# Patient Record
Sex: FEMALE | Race: WHITE
[De-identification: names, ages, dates, MRNs, and addresses within clinical notes are randomized per-mention and may not be internally consistent; named-entity substitution may affect disease eponyms.]

---

## 2017-01-25 ENCOUNTER — HOSPITAL ENCOUNTER (OUTPATIENT)
Dept: HOSPITAL 45 - C.ULTR | Age: 27
Discharge: HOME | End: 2017-01-25
Attending: PREVENTIVE MEDICINE
Payer: COMMERCIAL

## 2017-01-25 DIAGNOSIS — Z01.89: Primary | ICD-10-CM

## 2017-01-25 NOTE — DIAGNOSTIC IMAGING REPORT
RIGHT LOWER EXTREMITY VENOUS DOPPLER



CLINICAL HISTORY: Right leg pain.    



COMPARISON STUDY:  No previous studies for comparison. 



TECHNIQUE:  Sonography of the deep venous system of the right lower extremity

was performed. Compression and augmentation were evaluated.



FINDINGS:  The common femoral, superficial femoral and popliteal veins were

compressible. Augmentation was normal. Flow was shown within the deep calf

vessels.



IMPRESSION: No evidence of deep venous thrombus within the right lower

extremity.







Electronically signed by:  Fabian Solano M.D.

1/25/2017 1:04 PM



Dictated Date/Time:  1/25/2017 1:04 PM

## 2017-01-28 NOTE — CODING QUERY NO DIAGNOSIS
TREATMENT RENDERED WITHOUT A DIAGNOSIS                                                  



To promote full compliance with coding requirements relating to patient care, physician 
participation is requested in all cases of  uncertainty.  Please assist us with 
providing a diagnosis/symptom for the test(s) below:



A diagnosis/symptom was not documented on your Order.  A valid diagnosis/symptom is 
required to bill all insurances.



**Please remember that we are unable to code a diagnosis of rule out, probable, possible, 
questionable, or suspected.  



Tests that require a diagnosis:



DOS: 1/25/17

* RIGHT LEG DOPPLER US      DIAGNOSIS:





Provider Signature: _____________________________ Date: _________



Thank you  

[facility] Information Management

Phone:  [phone]

Fax:  [phone]



Once completed, please kindly fax back to [phone]



For questions please call [phone]

## 2017-05-08 ENCOUNTER — HOSPITAL ENCOUNTER (EMERGENCY)
Dept: HOSPITAL 45 - C.EDB | Age: 27
Discharge: HOME | End: 2017-05-08
Payer: COMMERCIAL

## 2017-05-08 VITALS
BODY MASS INDEX: 26.3 KG/M2 | BODY MASS INDEX: 26.3 KG/M2 | WEIGHT: 157.85 LBS | HEIGHT: 65 IN | WEIGHT: 157.85 LBS | HEIGHT: 65 IN

## 2017-05-08 VITALS — SYSTOLIC BLOOD PRESSURE: 131 MMHG | HEART RATE: 72 BPM | OXYGEN SATURATION: 98 % | DIASTOLIC BLOOD PRESSURE: 80 MMHG

## 2017-05-08 VITALS — TEMPERATURE: 98.24 F

## 2017-05-08 DIAGNOSIS — Z87.440: ICD-10-CM
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
## 2017-05-08 DIAGNOSIS — M54.5: Primary | ICD-10-CM

## 2017-05-08 DIAGNOSIS — Z87.19: ICD-10-CM

## 2017-05-08 DIAGNOSIS — Z79.899: ICD-10-CM

## 2017-05-08 NOTE — EMERGENCY ROOM VISIT NOTE
History


First contact with patient:  16:42


Chief Complaint:  BACK PAIN


Stated Complaint:  BULGING DISC,NUMBNESS RT LEG,RT HIP PAIN,WC





History of Present Illness


The patient is a 26 year old female who presents to the Emergency Room with 

complaints of persistent lower back pain radiating down the right leg to her 

foot.  The patient reports that she sustained a Worker's Compensation injury on 

1/24/17.  She has been under the management of Dr. Ibarra, [facility] and Dr. Shanks.  The patient reports that she had an appointment 

scheduled with the pain clinic on 5/4, but was rescheduled for 5/18.  She also 

had an appointment scheduled with Dr. Shanks 1/24.  The patient reports that 

she had an independent medical evaluation (MINDI) performed with Dr. Gupta as part 

of her Worker's Compensation evaluation.  The patient reports that her case was 

closed on 5/1/17, and she was directed back to work for full duty without any 

restrictions.  The patient reports progressively worsening symptoms at this 

point, and was told by her  to come to the emergency department.  The 

patient and MRI performed at the time of injury.  The patient denies any 

interval significant trauma to the back.  She currently denies any right lower 

extremity weakness, foot drop, saddle anesthesias or bladder/bowel difficulties/

incontinence.  She rates her discomfort a 6 out of 10.





Review of Systems


10 system review was performed and was negative except for pertinent positives 

and negatives as indicated in history of present illness





Past Medical/Surgical History


Medical Problems:


(1) Bleeding


(2) Bronchitis


(3) Headache in pregnancy


(4) History of urinary tract problem


(5) possible rupture of membranes


(6) spontaneous rupture of membranes at term


(7) Stomach problems


Surgical Problems:


(1) H/O colonoscopy


(2) History of wisdom tooth extraction








Family History





Cancer


Heart disease


Hypertension


Kidney disease


Kidney stones





Social History


Smoking Status:  Never Smoker


Alcohol Use:  none


Drug Use:  none


Marital Status:  in relationship


Occupation Status:  employed





Current/Historical Medications


Scheduled


Birth Control Pills (Birth Control Pills), 1 TAB PO DAILY


Diclofenac (Voltaren), 50 MG PO Q12


Methylprednisolone (Medrol Dosepak), 0 PO DAILY





Allergies


Coded Allergies:  


     No Known Allergies (Unverified , 10/23/16)





Physical Exam


Vital Signs











  Date Time  Temp Pulse Resp B/P Pulse Ox O2 Delivery O2 Flow Rate FiO2


 


5/8/17 17:22  72 18 131/80 98 Room Air  


 


5/8/17 16:14 36.8 82 18 143/98 98 Room Air  








Pain Rating (0-10):  6.0





Physical Exam


CONSTITUTIONAL:  Healthy and well nourished.  Alert and oriented X 3 with 

positive affect.  Patient does not appear in any acute distress on exam.


HEENT:  Normocephalic, atraumatic.  Pupils equal, round and reactive.  


NECK:  Full active range of motion without discomfort.


RESPIRATORY:  Clear to auscultation bilaterally with no wheezing, crackles, 

rhonchi or stridor.


CARDIOVASCULAR:  Regular rate and rhythm with no murmurs, rubs or gallops.


GASTROINTESTINAL:  Bowel sounds present in all quadrants.  Soft and nontender 

to palpation.


MUSCULOSKELETAL: Examination shows tenderness to palpation through the right 

lower lumbar region, SI joint and sciatic notch.  Negative logroll.  Positive 

straight leg raise.  Ankle plantar/dorsiflexion strength is 5 out of 5 and 

symmetric bilaterally.  Pedal pulses are intact.


INTEGUMENTARY:  No rash or other significant dermatologic conditions noted.


NEUROLOGIC:  No focal neurologic deficits noted.  Lower extremity deep tendon 

reflexes are 2+ and symmetric bilaterally.





Medical Decision & Procedures


ED Course


Patient history and physical exam were performed.  Nurse's notes were reviewed.

  I did review prior medical records, including the patient's office notes at 

the pain clinic on 4/11/17.  On that visit, it was suggested that the patient 

undergo an L5-S1 intralaminar epidural steroid injection.  As indicated in the 

history of present illness, she was scheduled this month for that injection.  

She also reports that Dr. Shanks was going to wait to see how she responded to 

the injection.  Also as indicated in history of present illness, the patient 

was sent back to work without restrictions as a result of her IMNDI on 4/24/17.  

After explaining her difficulties with her , she was sent to the 

emergency department.  The patient is requesting another note for light duty.  

The patient was advised that I do not have any power to override her Worker's 

Compensation decision.  I did try to call Dr. Ibarra, the patient's primary 

Worker's Compensation provider.  He was not in the office today, nor was his 

nurse practitioner, Lea Pino.  I did leave a message for her to call the 

patient regarding further details of her MINDI, and other possible options for 

her case.  I suspect the patient will have to work through her  to 

figure this out.  In the meantime, the patient was provided a prescription for 

a Medrol Dosepak.  The patient is currently taking Voltaren 50 mg twice a day, 

but reports that it gives her headaches.  She is able to tolerate ibuprofen, 

and was therefore encouraged to alternate ibuprofen and Tylenol for pain.  She 

refused any prescription analgesics.  The patient was advised that she may need 

to follow-up with her PCP, pain clinic and Dr. Shanks until her Worker's 

Compensation situation is figured out.  The patient voiced understanding of all 

discharge instructions, was happy with plan of care, and rated her discomfort a 

4 out of 10 at the time of discharge.





Impression





 Primary Impression:  


 Lumbar pain


 Additional Impression:  


 Work related injury





Departure Information


Dispostion


Home / Self-Care





Condition


GOOD





Prescriptions





Methylprednisolone (MEDROL DOSEPAK) 4 Mg Son


0 PO DAILY, #1 PKT


   Prov: Heath Jennings PA         5/8/17





Referrals


Ashutosh Ibarra M.D.





Forms


HOME CARE DOCUMENTATION FORM,                                                 

               IMPORTANT VISIT INFORMATION





Patient Instructions


My [facility]





Additional Instructions





Ibuprofen 800 mg and/or Tylenol 1000 mg every 8 hours.


You may also alternate these medications for more effective pain relief:


Ibuprofen --4 HRS--> Tylenol --4 HRS--> ibuprofen --4 HRS--> Tylenol ....


Stop the diclofenac for now.


Take Medrol Dosepak as prescribed.


Continue follow-up with your family doctor, pain clinic and Dr. Shanks until 

your  further evaluates your case.





Problem Qualifiers








 Primary Impression:  


 Lumbar pain


 Chronicity:  chronic  Back pain laterality:  right  Sciatica presence:  with 

sciatica  Sciatica laterality:  sciatica of right side  Qualified Codes:  

M54.41 - Lumbago with sciatica, right side; G89.29 - Other chronic pain

## 2017-10-29 ENCOUNTER — HOSPITAL ENCOUNTER (EMERGENCY)
Dept: HOSPITAL 45 - C.EDB | Age: 27
Discharge: HOME | End: 2017-10-29
Payer: COMMERCIAL

## 2017-10-29 VITALS — HEART RATE: 75 BPM | OXYGEN SATURATION: 98 % | DIASTOLIC BLOOD PRESSURE: 76 MMHG | SYSTOLIC BLOOD PRESSURE: 118 MMHG

## 2017-10-29 VITALS
WEIGHT: 151.46 LBS | BODY MASS INDEX: 25.23 KG/M2 | HEIGHT: 65 IN | HEIGHT: 65 IN | BODY MASS INDEX: 25.23 KG/M2 | WEIGHT: 151.46 LBS

## 2017-10-29 VITALS — TEMPERATURE: 98.42 F

## 2017-10-29 DIAGNOSIS — Z84.1: ICD-10-CM

## 2017-10-29 DIAGNOSIS — R16.1: ICD-10-CM

## 2017-10-29 DIAGNOSIS — R59.1: ICD-10-CM

## 2017-10-29 DIAGNOSIS — Z79.3: ICD-10-CM

## 2017-10-29 DIAGNOSIS — Z82.49: ICD-10-CM

## 2017-10-29 DIAGNOSIS — J02.9: Primary | ICD-10-CM

## 2017-10-29 DIAGNOSIS — Z80.9: ICD-10-CM

## 2017-10-29 LAB
ALBUMIN/GLOB SERPL: 0.9 {RATIO} (ref 0.9–2)
ALP SERPL-CCNC: 56 U/L (ref 45–117)
ALT SERPL-CCNC: 15 U/L (ref 12–78)
ANION GAP SERPL CALC-SCNC: 5 MMOL/L (ref 3–11)
APPEARANCE UR: (no result)
AST SERPL-CCNC: 10 U/L (ref 15–37)
BASOPHILS # BLD: 0.02 K/UL (ref 0–0.2)
BASOPHILS NFR BLD: 0.3 %
BILIRUB UR-MCNC: (no result) MG/DL
BUN SERPL-MCNC: 10 MG/DL (ref 7–18)
BUN/CREAT SERPL: 12.9 (ref 10–20)
CALCIUM SERPL-MCNC: 8.9 MG/DL (ref 8.5–10.1)
CHLORIDE SERPL-SCNC: 104 MMOL/L (ref 98–107)
CO2 SERPL-SCNC: 29 MMOL/L (ref 21–32)
COLOR UR: (no result)
COMPLETE: YES
CREAT CL PREDICTED SERPL C-G-VRATE: 107.4 ML/MIN
CREAT SERPL-MCNC: 0.78 MG/DL (ref 0.6–1.2)
EOSINOPHIL NFR BLD AUTO: 217 K/UL (ref 130–400)
GLOBULIN SER-MCNC: 3.7 GM/DL (ref 2.5–4)
GLUCOSE SERPL-MCNC: 77 MG/DL (ref 70–99)
HCT VFR BLD CALC: 38.5 % (ref 37–47)
IG%: 0.3 %
IMM GRANULOCYTES NFR BLD AUTO: 15.2 %
LYMPHOCYTES # BLD: 1.18 K/UL (ref 1.2–3.4)
MANUAL MICROSCOPIC REQUIRED?: NO
MCH RBC QN AUTO: 29.8 PG (ref 25–34)
MCHC RBC AUTO-ENTMCNC: 33.5 G/DL (ref 32–36)
MCV RBC AUTO: 88.9 FL (ref 80–100)
MONOCYTES NFR BLD: 16 %
NEUTROPHILS # BLD AUTO: 0 %
NEUTROPHILS NFR BLD AUTO: 68.2 %
NITRITE UR QL STRIP: (no result)
PH UR STRIP: 5.5 [PH] (ref 4.5–7.5)
PMV BLD AUTO: 10.2 FL (ref 7.4–10.4)
POTASSIUM SERPL-SCNC: 3.2 MMOL/L (ref 3.5–5.1)
PREG INTERNAL NEGATIVE QC: (no result)
PREG INTERNAL POSITIVE QC: (no result)
RBC # BLD AUTO: 4.33 M/UL (ref 4.2–5.4)
REVIEW REQ?: NO
SODIUM SERPL-SCNC: 139 MMOL/L (ref 136–145)
SP GR UR STRIP: 1.03 (ref 1–1.03)
URINE BILL WITH OR WITHOUT MIC: (no result)
URINE EPITHELIAL CELL AUTO: >30 /LPF (ref 0–5)
UROBILINOGEN UR-MCNC: (no result) MG/DL
WBC # BLD AUTO: 7.76 K/UL (ref 4.8–10.8)
ZZUR CULT IF INDIC CLEAN CATCH: YES

## 2017-10-29 NOTE — EMERGENCY ROOM VISIT NOTE
History


First contact with patient:  11:52


Chief Complaint:  ILLNESS


Stated Complaint:  ENLARGED SPLEEN POSSIBLY MONO





History of Present Illness


The patient is a 27 year old female who presents to the Emergency Room with 

complaints of an enlarged spleen and possible mono.  The patient was seen by 

urgent care earlier today and was advised that she had an enlarged spleen and 

liver on exam.  While her mono test was negative, the examiner did suspect mono

, as the patient has also had a sore throat, hoarseness of her voice, 

difficulty swallowing, and lymphadenopathy.  The patient's boyfriend became 

concerned and encouraged the patient to be seen in the emergency department for 

further evaluation due to her symptoms.  She is experiencing significant pain 

on the left side of her abdomen, worse with walking.  She states the pain began 

yesterday, and has been worsening since.  She did try DayQuil at one point due 

to the sore throat, however did not notice any improvement in her symptoms.  

The patient has not taken any anti-inflammatory or pain medication.  She does 

feel that her abdomen is distended and hard.  She denies any fever, chills, 

nausea, vomiting, otalgia, sinus congestion, headache, dyspnea, chest pain, 

cough, or other concerning symptoms.





Review of Systems


A complete 10 point review of systems was reviewed with the patient with 

pertinent positives and negatives as per history of present illness. All else 

were negative.





Past Medical/Surgical History


Medical Problems:


(1) Bleeding


(2) Bronchitis


(3) Headache in pregnancy


(4) History of urinary tract problem


(5) possible rupture of membranes


(6) spontaneous rupture of membranes at term


(7) Stomach problems


Surgical Problems:


(1) H/O colonoscopy


(2) History of wisdom tooth extraction








Family History





Cancer


Heart disease


Hypertension


Kidney disease


Kidney stones





Social History


Smoking Status:  Never Smoker


Alcohol Use:  none


Drug Use:  none


Marital Status:  in relationship


Occupation Status:  employed





Current/Historical Medications


Scheduled


Birth Control Pills (Birth Control Pills), 1 TAB PO DAILY





Physical Exam


Vital Signs











  Date Time  Temp Pulse Resp B/P (MAP) Pulse Ox O2 Delivery O2 Flow Rate FiO2


 


10/29/17 14:49  75 16 118/76 98   


 


10/29/17 13:55  65 16 130/70 98 Room Air  


 


10/29/17 11:46 36.9 90 18 143/89 98 Room Air  











Physical Exam


VITALS: Vitals are noted on the nurse's note and reviewed by myself.  Vital 

signs stable.


GENERAL: This is a 27-year-old female, in no acute distress, nondiaphoretic, 

well-developed well-nourished.


SKIN: The skin was without rashes, erythema, edema, or bruising.  There is no 

tenting of the skin.  Capillary reflex less than 2 seconds.


HEAD: Normocephalic atraumatic.  


EARS: External auditory canals clear, tympanic membranes pearly gray without 

erythema or effusion bilaterally.


EYES: Pupils equal round and reactive to light and accommodation.  Conjunctivae 

without injection, sclerae without icterus.  Extraocular movements intact.  


NOSE: Patent, turbinates without inflammation or discharge.  No sinus 

tenderness.


MOUTH: Mucous membranes moist.  Tonsils are not enlarged.  Pharynx with mild 

erythema and exudate.  Uvula midline.  Airway patent.  Tongue does not deviate.

  


NECK: Supple without nuchal rigidity.  Cervical lymphadenopathy noted 

bilaterally in anterior and posterior chains.  No thyromegaly.  Cervical spine 

is nontender.  No JVD.


HEART: Regular rate and rhythm without murmurs gallops or rubs.


LUNGS: Clear to auscultation bilaterally without wheezes, rales or rhonchi.  No 

dullness to percussion.  No retractions or accessory muscle use.


ABDOMEN: Positive bowel sounds x 4.  Normal tympanic percussion.  There is 

tenderness and fullness of the left upper quadrant.  The spleen does appear to 

be slightly enlarged on examination. Otherwise, the abdomen was soft, nontender

, without masses or other organomegaly.  Brice sign negative.  No guarding or 

rebound tenderness.


MUSCULOSKELETAL: No muscle atrophy, erythema, or edema noted.  Full range of 

motion without joint tenderness in all extremities.  No tenderness to 

palpation.  Normal gait.  Strength 5/5 throughout.


NEURO: Patient was alert and oriented to person place and time.  Normal 

sensation to light and sharp touch.  Deep tendon reflexes 2+ throughout.  No 

focal neurological deficits.





Medical Decision & Procedures


ER Provider


Diagnostic Interpretation:


CBC without leukocytosis, anemia, thrombocytopenia.





CMP did show slightly decreased potassium of 3.2.  Otherwise electrolytes were 

without abnormality.  Renal and hepatic function appeared to be normal.





Lipase was negative.





Urinalysis does appear to be contaminated, however there were white blood cells

, leuk esterase, ketones, and trace protein.  I do suspect this is related to 

the patient's overall illness and slight dehydration.  





Mono screen was negative.





Rapid strep screen was negative.





U/S Limited:


ABDOMEN LIMITED (US)





HISTORY: Pain. Nausea.  upper abdominal pain (RUQ, LUQ).





COMPARISON:  None.





FINDINGS:





Pancreas: The pancreas demonstrates a normal echotexture.





Liver: Unremarkable.





Gallbladder: No gallbladder wall thickening. No gallstones.





CBD: 4 mm





Right kidney: No hydronephrosis.





IMPRESSION: 


No significant abnormality identified within the within the right upper


quadrant.














The above report was generated using voice recognition software.  It may contain


grammatical, syntax or spelling errors.











Electronically signed by:  Zachariah Medina M.D.


10/29/2017 1:33 PM





Dictated Date/Time:  10/29/2017 1:32 PM





*I did speak with Dr. Medina on the phone regarding the spleen.  He states the 

spleen is 12 centimeters and prominent, however does not appear to be 

significantly enlarged or concerning.





Laboratory Results


10/29/17 12:20








Red Blood Count 4.33, Mean Corpuscular Volume 88.9, Mean Corpuscular Hemoglobin 

29.8, Mean Corpuscular Hemoglobin Concent 33.5, Mean Platelet Volume 10.2, 

Neutrophils (%) (Auto) 68.2, Lymphocytes (%) (Auto) 15.2, Monocytes (%) (Auto) 

16.0, Eosinophils (%) (Auto) 0.0, Basophils (%) (Auto) 0.3, Neutrophils # (Auto

) 5.30, Lymphocytes # (Auto) 1.18, Monocytes # (Auto) 1.24, Eosinophils # (Auto

) 0.00, Basophils # (Auto) 0.02





10/29/17 12:20

















Test


  10/29/17


12:03 10/29/17


12:20


 


Urine Pregnancy Test NEG (NEG)  


 


White Blood Count


  


  7.76 K/uL


(4.8-10.8)


 


Red Blood Count


  


  4.33 M/uL


(4.2-5.4)


 


Hemoglobin


  


  12.9 g/dL


(12.0-16.0)


 


Hematocrit  38.5 % (37-47) 


 


Mean Corpuscular Volume


  


  88.9 fL


()


 


Mean Corpuscular Hemoglobin


  


  29.8 pg


(25-34)


 


Mean Corpuscular Hemoglobin


Concent 


  33.5 g/dl


(32-36)


 


Platelet Count


  


  217 K/uL


(130-400)


 


Mean Platelet Volume


  


  10.2 fL


(7.4-10.4)


 


Neutrophils (%) (Auto)  68.2 % 


 


Lymphocytes (%) (Auto)  15.2 % 


 


Monocytes (%) (Auto)  16.0 % 


 


Eosinophils (%) (Auto)  0.0 % 


 


Basophils (%) (Auto)  0.3 % 


 


Neutrophils # (Auto)


  


  5.30 K/uL


(1.4-6.5)


 


Lymphocytes # (Auto)


  


  1.18 K/uL


(1.2-3.4)


 


Monocytes # (Auto)


  


  1.24 K/uL


(0.11-0.59)


 


Eosinophils # (Auto)


  


  0.00 K/uL


(0-0.5)


 


Basophils # (Auto)


  


  0.02 K/uL


(0-0.2)


 


RDW Standard Deviation


  


  40.7 fL


(36.4-46.3)


 


RDW Coefficient of Variation


  


  12.6 %


(11.5-14.5)


 


Immature Granulocyte % (Auto)  0.3 % 


 


Immature Granulocyte # (Auto)


  


  0.02 K/uL


(0.00-0.02)


 


Urine Color  DK YELLOW 


 


Urine Appearance  CLOUDY (CLEAR) 


 


Urine pH  5.5 (4.5-7.5) 


 


Urine Specific Gravity


  


  1.029


(1.000-1.030)


 


Urine Protein  TRACE (NEG) 


 


Urine Glucose (UA)  NEG (NEG) 


 


Urine Ketones  2+ (NEG) 


 


Urine Occult Blood  NEG (NEG) 


 


Urine Nitrite  NEG (NEG) 


 


Urine Bilirubin  NEG (NEG) 


 


Urine Urobilinogen  NEG (NEG) 


 


Urine Leukocyte Esterase  SMALL (NEG) 


 


Urine WBC (Auto)


  


  5-10 /hpf


(0-5)


 


Urine RBC (Auto)  0-4 /hpf (0-4) 


 


Urine Hyaline Casts (Auto)


  


  5-10 /lpf


(0-5)


 


Urine Epithelial Cells (Auto)  >30 /lpf (0-5) 


 


Urine Bacteria (Auto)  1+ (NEG) 


 


Anion Gap


  


  5.0 mmol/L


(3-11)


 


Est Creatinine Clear Calc


Drug Dose 


  107.4 ml/min 


 


 


Estimated GFR (


American) 


  122.5 


 


 


Estimated GFR (Non-


American 


  105.7 


 


 


BUN/Creatinine Ratio  12.9 (10-20) 


 


Calcium Level


  


  8.9 mg/dl


(8.5-10.1)


 


Total Bilirubin


  


  0.6 mg/dl


(0.2-1)


 


Aspartate Amino Transf


(AST/SGOT) 


  10 U/L (15-37) 


 


 


Alanine Aminotransferase


(ALT/SGPT) 


  15 U/L (12-78) 


 


 


Alkaline Phosphatase


  


  56 U/L


()


 


Total Protein


  


  7.2 gm/dl


(6.4-8.2)


 


Albumin


  


  3.5 gm/dl


(3.4-5.0)


 


Globulin


  


  3.7 gm/dl


(2.5-4.0)


 


Albumin/Globulin Ratio  0.9 (0.9-2) 


 


Lipase


  


  153 U/L


()


 


Monoscreen  NEG (NEG) 











Medications Administered











 Medications


  (Trade)  Dose


 Ordered  Sig/[place]


 Route  Start Time


 Stop Time Status Last Admin


Dose Admin


 


 Ketorolac


 Tromethamine


  (Toradol Inj)  30 mg  NOW  STAT


 IV  10/29/17 13:08


 10/29/17 13:09 DC 10/29/17 13:53


30 MG











Medical Decision


The patient presents today complaining of symptoms of mono.  She has a sore 

throat, lymphadenopathy, and abdominal pain.  Although her mono screen is 

negative, I do suspect mononucleosis as the cause of her symptoms.  The patient 

does have an enlarged spleen on examination.  Ultrasound does not reveal any 

concerning abnormalities.  She was given 30mg Toradol via IV.  I discussed the 

findings with the patient at bedside, discussed with her proper management of 

mononucleosis, including supportive care.  I advised her that antibiotics do 

not treat this viral illness.  The patient was in agreement with the assessment 

and plan.  She was discharged home in good condition.





Differential diagnosis: Mononucleosis, strep pharyngitis, upper respiratory 

infection, acute gastroenteritis, pancreatitis, cholecystitis, GERD, 

splenomegaly, splenic rupture, malignancy, and others.





Medication Reconcilliation


Current Medication List:  was personally reviewed by me





Blood Pressure Screening


Patient's blood pressure:  Normal blood pressure





Impression





 Primary Impression:  


 Sore throat


 Additional Impressions:  


 Splenomegaly


 Lymphadenopathy of head and neck region





Departure Information


Dispostion


Home / Self-Care





Condition


GOOD





Referrals


SKYLAR Strange M.D. (MEDICAL) (PCP)





Patient Instructions


ED Mononucleosis, My [facility]





Additional Instructions





You were seen in the emergency department for sore throat, enlarged lymph nodes

, and splenomegaly.  As discussed, although your mono screen was negative, it 

is possible that you have mononucleosis.  This is viral in nature, and 

antibiotics will not treat.





You should drink plenty of fluids and stay well-hydrated.  You may consider anti

-inflammatory medication such as ibuprofen to help with abdominal pain.





Ibuprofen(Motrin, Advil) may be used for fever or pain.  Use 600mg every six 

hours as needed.  Take with food.  Avoid using more than 2400mg in a 24 hour 

period.  Do not use 2400mg per day for more than three consecutive days without 

physician direction.  Prolonged inappropriate use can lead to stomach upset or 

ulcers. 


(AND/OR)


Acetaminophen(Tylenol) may be used for fever or pain.  Use 1000mg every six 

hours as needed.  Avoid using more than 3000mg in a 24 hour period.  





Please avoid sharing sputum with others.





Please avoid physical activity which may worsen your symptoms.





Please follow up with your PCP in 2-3 days for recheck and further management 

of her disease.





Return to the emergency department for worsening abdominal pain, bruising, 

swelling, nausea and vomiting, significant fever, or other concerning symptoms.





Problem Qualifiers

## 2017-10-29 NOTE — DIAGNOSTIC IMAGING REPORT
ABDOMEN LIMITED (US)



HISTORY: Pain. Nausea.  upper abdominal pain (RUQ, LUQ).



COMPARISON:  None.



FINDINGS:



Pancreas: The pancreas demonstrates a normal echotexture.



Liver: Unremarkable.



Gallbladder: No gallbladder wall thickening. No gallstones.



CBD: 4 mm



Right kidney: No hydronephrosis.



IMPRESSION: 

No significant abnormality identified within the within the right upper

quadrant.









The above report was generated using voice recognition software.  It may contain

grammatical, syntax or spelling errors.







Electronically signed by:  Zachariah Medina M.D.

10/29/2017 1:33 PM



Dictated Date/Time:  10/29/2017 1:32 PM

## 2018-01-09 ENCOUNTER — HOSPITAL ENCOUNTER (EMERGENCY)
Dept: HOSPITAL 45 - C.EDB | Age: 28
LOS: 1 days | Discharge: HOME | End: 2018-01-10
Payer: SELF-PAY

## 2018-01-09 VITALS
HEIGHT: 65 IN | HEIGHT: 65 IN | BODY MASS INDEX: 24.94 KG/M2 | WEIGHT: 149.69 LBS | WEIGHT: 149.69 LBS | BODY MASS INDEX: 24.94 KG/M2

## 2018-01-09 VITALS — TEMPERATURE: 99.68 F

## 2018-01-09 DIAGNOSIS — Z98.890: ICD-10-CM

## 2018-01-09 DIAGNOSIS — Z84.1: ICD-10-CM

## 2018-01-09 DIAGNOSIS — R11.2: Primary | ICD-10-CM

## 2018-01-09 DIAGNOSIS — M54.9: ICD-10-CM

## 2018-01-09 DIAGNOSIS — I88.0: ICD-10-CM

## 2018-01-09 DIAGNOSIS — R06.02: ICD-10-CM

## 2018-01-09 DIAGNOSIS — Z82.49: ICD-10-CM

## 2018-01-09 DIAGNOSIS — Z98.818: ICD-10-CM

## 2018-01-09 LAB
ALBUMIN SERPL-MCNC: 3.8 GM/DL (ref 3.4–5)
ALT SERPL-CCNC: 18 U/L (ref 12–78)
BASOPHILS # BLD: 0.02 K/UL (ref 0–0.2)
BASOPHILS NFR BLD: 0.2 %
BUN SERPL-MCNC: 15 MG/DL (ref 7–18)
CALCIUM SERPL-MCNC: 8.7 MG/DL (ref 8.5–10.1)
CO2 SERPL-SCNC: 27 MMOL/L (ref 21–32)
CREAT SERPL-MCNC: 0.78 MG/DL (ref 0.6–1.2)
EOS ABS #: 0.03 K/UL (ref 0–0.5)
EOSINOPHIL NFR BLD AUTO: 201 K/UL (ref 130–400)
GLUCOSE SERPL-MCNC: 91 MG/DL (ref 70–99)
HCT VFR BLD CALC: 44.5 % (ref 37–47)
HGB BLD-MCNC: 15.1 G/DL (ref 12–16)
IG#: 0.03 K/UL (ref 0–0.02)
IMM GRANULOCYTES NFR BLD AUTO: 5.4 %
LIPASE: 134 U/L (ref 73–393)
LYMPHOCYTES # BLD: 0.46 K/UL (ref 1.2–3.4)
MCH RBC QN AUTO: 30.8 PG (ref 25–34)
MCHC RBC AUTO-ENTMCNC: 33.9 G/DL (ref 32–36)
MCV RBC AUTO: 90.6 FL (ref 80–100)
MONO ABS #: 0.62 K/UL (ref 0.11–0.59)
MONOCYTES NFR BLD: 7.3 %
NEUT ABS #: 7.33 K/UL (ref 1.4–6.5)
NEUTROPHILS # BLD AUTO: 0.4 %
NEUTROPHILS NFR BLD AUTO: 86.3 %
PMV BLD AUTO: 10.4 FL (ref 7.4–10.4)
POTASSIUM SERPL-SCNC: 3.2 MMOL/L (ref 3.5–5.1)
RED CELL DISTRIBUTION WIDTH CV: 12.3 % (ref 11.5–14.5)
RED CELL DISTRIBUTION WIDTH SD: 41.2 FL (ref 36.4–46.3)
SODIUM SERPL-SCNC: 137 MMOL/L (ref 136–145)
WBC # BLD AUTO: 8.49 K/UL (ref 4.8–10.8)

## 2018-01-09 NOTE — EMERGENCY ROOM VISIT NOTE
History


Report prepared by Caryn:  Cheyenne Belcher


Under the Supervision of:  Dr. Garrick Flowers M.D.


First contact with patient:  22:45


Chief Complaint:  ABDOMINAL PAIN


Stated Complaint:  BACK HURTS, VOMITTING ALL DAY, RIBS CRUSHING





History of Present Illness


The patient is a 27 year old female who presents to the Emergency Room with 

complaints of intermittent vomiting beginning this morning. The patient states 

that she has not been feeling well today and has had nausea and intermittent 

vomiting all day. She reports that she has not been able to keep anything down. 

She complains of generalized back pain, headache, shortness of breath with 

exertion, and squeezing bilateral rib pain. The patient denies any sore throat, 

diarrhea, urinary symptoms, sick contacts, and chest pain. She notes that her 

LNMP was last week and she has no history of abdominal surgery or daily 

medications.





   Source of History:  patient


   Onset:  this morning


   Position:  other (global)


   Quality:  other (vomiting)


   Timing:  intermittent


   Associated Symptoms:  + headache, + SOB, + nausea, + back pain, No sorethroat

, No chest pain, No diarrhea, No urinary symptoms


Note:


Pt complains of rib pain.





Review of Systems


See HPI for pertinent positives & negatives. A total of 10 systems reviewed and 

were otherwise negative.





Past Medical & Surgical


Medical Problems:


(1) Bleeding


(2) Bronchitis


(3) Headache in pregnancy


(4) History of urinary tract problem


(5) possible rupture of membranes


(6) spontaneous rupture of membranes at term


(7) Stomach problems


Surgical Problems:


(1) H/O colonoscopy


(2) History of wisdom tooth extraction





Old medical records were reviewed. Nurse's notes were reviewed and I agree with.





Family History





Cancer


Heart disease


Hypertension


Kidney disease


Kidney stones





Social History


Smoking Status:  Never Smoker


Alcohol Use:  none


Drug Use:  none


Marital Status:  in relationship


Occupation Status:  employed





Current/Historical Medications


Scheduled


Birth Control Pills (Birth Control Pills), 1 TAB PO DAILY


Ondasetron Odt (Zofran Odt), 4 MG SL Q6H





Scheduled PRN


Oxycodone Immediate Rel Tab (Roxicodone Ir), 1-2 TAB PO Q4H PRN for Severe Pain





Allergies


Coded Allergies:  


     No Known Allergies (Unverified , 1/9/18)





Physical Exam


Vital Signs











  Date Time  Temp Pulse Resp B/P (MAP) Pulse Ox O2 Delivery O2 Flow Rate FiO2


 


1/10/18 02:17  84 20 131/74 99   


 


1/10/18 00:51  91 18 134/78 98 Room Air  


 


1/9/18 22:12 37.6 111 19 124/68 96 Room Air  











Physical Exam


General: Non-ill appearing young female in no acute distress. 


HEENT: Normal cephalic atraumatic.  Pupils are equal round and reactive to 

light.  Extraocular movements are intact.  Oropharynx is pink with moist mucous 

membranes.  No swelling of the mouth lips or tongue.


Neck: Supple with a midline trachea.  No meningeal signs or stiffness, no JVD 

or bruits. No Stridor.


Chest: Clear to auscultation bilaterally.  No wheezes or rhonchi.  No increased 

work of breathing.


Heart: regular rate and rhythm. 


Abdomen: Soft, mildly tender in epigastric area, flank bilaterally nondistended 

without rebound guarding or rigidity.  


Extremities: No cyanosis clubbing or edema. No calf tenderness or assymetry


Spine/Back. Non tender to palpation. No CVA tenderness


Skin: Good turgor without rashes.


Neurologic exam: Cranial nerves two through 12 are intact.  Motor and sensation 

are intact and symmetrical throughout.





Medical Decision & Procedures


ER Provider


Diagnostic Interpretation:


CT results as stated below per my review and radiologist interpretation: 





CT ABDOMEN & PELVIS w/o Contrast:





Nonspecific 6mm subpleural nodule at the lateral left lung base. 


Appendix is not visualized, but no inflammatory changes to suggest acute 

appendicitis. No bowel obstruction or inflammation. 


No renal or ureteral stone. No hydronephrosis in either kidney.


Tiny fat-containing umbilical hernia. 


Decompressed bladder. 


Small mesenteric lymph nodes are nonspecific but may be reactive. 


Nonspecific borderline size of the spleen. 





Radiologist: Clarisa Lloyd M.D.





Laboratory Results


1/9/18 23:13








Red Blood Count 4.91, Mean Corpuscular Volume 90.6, Mean Corpuscular Hemoglobin 

30.8, Mean Corpuscular Hemoglobin Concent 33.9, Mean Platelet Volume 10.4, 

Neutrophils (%) (Auto) 86.3, Lymphocytes (%) (Auto) 5.4, Monocytes (%) (Auto) 

7.3, Eosinophils (%) (Auto) 0.4, Basophils (%) (Auto) 0.2, Neutrophils # (Auto) 

7.33, Lymphocytes # (Auto) 0.46, Monocytes # (Auto) 0.62, Eosinophils # (Auto) 

0.03, Basophils # (Auto) 0.02





1/9/18 23:13

















Test


  1/9/18


23:13 1/9/18


23:37


 


White Blood Count


  8.49 K/uL


(4.8-10.8) 


 


 


Red Blood Count


  4.91 M/uL


(4.2-5.4) 


 


 


Hemoglobin


  15.1 g/dL


(12.0-16.0) 


 


 


Hematocrit 44.5 % (37-47)  


 


Mean Corpuscular Volume


  90.6 fL


() 


 


 


Mean Corpuscular Hemoglobin


  30.8 pg


(25-34) 


 


 


Mean Corpuscular Hemoglobin


Concent 33.9 g/dl


(32-36) 


 


 


Platelet Count


  201 K/uL


(130-400) 


 


 


Mean Platelet Volume


  10.4 fL


(7.4-10.4) 


 


 


Neutrophils (%) (Auto) 86.3 %  


 


Lymphocytes (%) (Auto) 5.4 %  


 


Monocytes (%) (Auto) 7.3 %  


 


Eosinophils (%) (Auto) 0.4 %  


 


Basophils (%) (Auto) 0.2 %  


 


Neutrophils # (Auto)


  7.33 K/uL


(1.4-6.5) 


 


 


Lymphocytes # (Auto)


  0.46 K/uL


(1.2-3.4) 


 


 


Monocytes # (Auto)


  0.62 K/uL


(0.11-0.59) 


 


 


Eosinophils # (Auto)


  0.03 K/uL


(0-0.5) 


 


 


Basophils # (Auto)


  0.02 K/uL


(0-0.2) 


 


 


RDW Standard Deviation


  41.2 fL


(36.4-46.3) 


 


 


RDW Coefficient of Variation


  12.3 %


(11.5-14.5) 


 


 


Immature Granulocyte % (Auto) 0.4 %  


 


Immature Granulocyte # (Auto)


  0.03 K/uL


(0.00-0.02) 


 


 


Anion Gap


  8.0 mmol/L


(3-11) 


 


 


Est Creatinine Clear Calc


Drug Dose 99.2 ml/min 


  


 


 


Estimated GFR (


American) 122.5 


  


 


 


Estimated GFR (Non-


American 105.7 


  


 


 


BUN/Creatinine Ratio 19.7 (10-20)  


 


Calcium Level


  8.7 mg/dl


(8.5-10.1) 


 


 


Total Bilirubin


  0.6 mg/dl


(0.2-1) 


 


 


Direct Bilirubin


  0.1 mg/dl


(0-0.2) 


 


 


Aspartate Amino Transf


(AST/SGOT) 10 U/L (15-37) 


  


 


 


Alanine Aminotransferase


(ALT/SGPT) 18 U/L (12-78) 


  


 


 


Alkaline Phosphatase


  66 U/L


() 


 


 


Total Protein


  7.3 gm/dl


(6.4-8.2) 


 


 


Albumin


  3.8 gm/dl


(3.4-5.0) 


 


 


Lipase


  134 U/L


() 


 


 


Human Chorionic Gonadotropin,


Qual NEG (NEG) 


  


 


 


Urine Color  YELLOW 


 


Urine Appearance


  


  SL CLOUDY


(CLEAR)


 


Urine pH  5.5 (4.5-7.5) 


 


Urine Specific Gravity


  


  >= 1.030


(1.000-1.030)


 


Urine Protein  NEG (NEG) 


 


Urine Glucose (UA)  NEG (NEG) 


 


Urine Ketones  NEG (NEG) 


 


Urine Occult Blood  NEG (NEG) 


 


Urine Nitrite  NEG (NEG) 


 


Urine Bilirubin  NEG (NEG) 


 


Urine Urobilinogen  NEG (NEG) 


 


Urine Leukocyte Esterase  NEG (NEG) 





Laboratory studies as stated above per my review.





Medications Administered











 Medications


  (Trade)  Dose


 Ordered  Sig/Emerson


 Route  Start Time


 Stop Time Status Last Admin


Dose Admin


 


 Sodium Chloride  1,000 ml @ 


 999 mls/hr  Q1H1M STAT


 IV  1/9/18 22:56


 1/9/18 23:56 DC 1/9/18 23:12


999 MLS/HR


 


 Ondansetron HCl


  (Zofran Inj)  4 mg  NOW  STAT


 IV  1/9/18 22:56


 1/9/18 22:57 DC 1/9/18 23:11


4 MG


 


 Ketorolac


 Tromethamine


  (Toradol Inj)  30 mg  NOW  STAT


 IV  1/9/18 22:56


 1/9/18 22:57 DC 1/9/18 23:12


30 MG


 


 Oxycodone HCl


  (Roxicodone


 Immediate Rel 5MG


 Home Pack)  1 homepack  UD  ONCE


 PO  1/10/18 02:00


 1/10/18 02:01 DC 1/10/18 02:12


1 HOMEPACK


 


 Ondansetron HCl


  (ZOFRAN ODT 4MG


 Home Pack)  1 homepack  UD  ONCE


 PO  1/10/18 02:00


 1/10/18 02:01 DC 1/10/18 02:12


1 HOMEPACK











ED Course


2245: Past medical records reviewed. The patient was evaluated in room C1, and 

a complete history and physical examination were performed.





2256: Toradol Inj 30mg IV, Zofran Inj 4mg IV, Sodium Chloride 1000 ml @ 999 mls/

hr IV. 





2342: I reevaluated the patient. The Toradol did not relieve her pain.





0158 : Upon reevaluation, the patient is doing well. I discussed the results 

and treatment plan with her. She verbalized agreement of the treatment plan. 

The patient was discharged home.





Medical Decision


Differentials include, but are not limited to; viral illness, gastroenteritis, 

pregnancy, liver disease, electrolyte or metabolic abnormality.





This patient comes in as described above.  She's had some nausea she has a pain 

in her bilateral upper abdomen and in the back bilaterally as well.  No 

shortness breath or pleurisy.  No trauma.  No dysuria or hematuria.  She denies 

that she is pregnant. IV access established and she was hydrated with IV normal 

saline.  She was given Toradol 30 mg IV as well as Zofran 4 mg IV.  This helped 

the nausea she still had some pain.  She's had no white count or fever to 

suggest infection.  She's not anemic.  Her pregnancy test is negative.  

Urinalysis is unremarkable and does not suggest UTI.  She has nothing to 

suggest liver or gallbladder or pancreas disease.  Her kidney function is 

normal.  I did do a CAT scan.  It was unremarkable for anything acute she does 

have some mesenteric adenitis and this could be causing her pain and may be 

more musculoskeletal or GI related.  There is no evidence of bowel obstruction 

or bacterial infection.  She'll rest and drink plenty of fluids.  Use ibuprofen 

and or Tylenol for pain.  For breakthrough pain she can use OxyIR 5 mg, one or 

2 pills her for 6 hours she is 1 ischemic her drowsy and do not take before 

drinking, driving, working.  For nausea May use Zofran.  Return if :increasing 

pain, worsening of symptoms, fever or chills, any new problems concerns.  They'

re happy with the plan and she was discharged to home.





Medication Reconcilliation


Current Medication List:  was personally reviewed by me





Blood Pressure Screening


Patient's blood pressure:  Normal blood pressure


Blood pressure disposition:  Did not require urgent referral





Impression





 Primary Impression:  


 Nausea


 Additional Impressions:  


 Back pain


 Mesenteric adenitis





Scribe Attestation


The scribe's documentation has been prepared under my direction and personally 

reviewed by me in its entirety. I confirm that the note above accurately 

reflects all work, treatment, procedures, and medical decision making performed 

by me.





Departure Information


Dispostion


Home / Self-Care





Prescriptions





Ondasetron Odt (ZOFRAN ODT) 4 Mg Tab


4 MG SL Q6H for Nausea, #10 TAB


   Prov: Garrick Flowers M.D.         1/10/18 


Oxycodone Immediate Rel Tab (ROXICODONE IR) 5 Mg Tab


1-2 TAB PO Q4H Y for Severe Pain, #10 TAB


   Prov: Garrick Flowers M.D.         1/10/18





Referrals


SKYLAR Strange M.D. (MEDICAL) (PCP)





Patient Instructions


My [facility]





Additional Instructions





Rest.  Drink plenty of fluids.  Mild diet.





For nausea, May use Zofran 4 mg every 6 hours if needed





For pain, may use over-the-counter acetaminophen/Tylenol and/or ibuprofen.  Do 

not exceed the over-the-counter dosing regimen 





For more severe pain, OxyIR 5 mg- 1-2 pills every 4-6 hours as needed


OxyIR may make you drowsy and do not take before drinking, driving, working





Return if: Increasing pain, worsening symptoms, fever or chills, any new 

problems or concerns





Follow-up with your doctor this week for recheck





Problem Qualifiers

## 2018-01-10 VITALS — SYSTOLIC BLOOD PRESSURE: 131 MMHG | OXYGEN SATURATION: 99 % | HEART RATE: 84 BPM | DIASTOLIC BLOOD PRESSURE: 74 MMHG

## 2018-01-10 LAB
ALP SERPL-CCNC: 66 U/L (ref 45–117)
AST SERPL-CCNC: 10 U/L (ref 15–37)
PROT SERPL-MCNC: 7.3 GM/DL (ref 6.4–8.2)

## 2018-01-10 NOTE — DIAGNOSTIC IMAGING REPORT
CT SCAN OF THE ABDOMEN AND PELVIS WITHOUT IV CONTRAST



CLINICAL HISTORY:   Epigastric abdominal pain. Back pain.



COMPARISON STUDY:  Abdominal ultrasound dated 10/29/2017.



TECHNIQUE: CT scan of the abdomen and pelvis is performed from the lung bases to

the proximal femora. Images are reviewed in the axial, sagittal, and coronal

planes. IV contrast was not administered for this examination as per the

referring clinician. Note that the examination was performed in suboptimal

fashion without oral and IV contrast. A dose lowering technique was utilized

adhering to the principles of ALARA.



CT DOSE: 623.42 mGy.cm



FINDINGS:



Lung bases: The heart is normal in size and without pericardial effusion. A 6 cm

left lower lobe nodule is seen on image #53. The lung bases are otherwise clear.



Liver: The unenhanced liver is normal in size, contour, and attenuation. There

is no intrahepatic biliary ductal dilatation.



Gallbladder: Unremarkable.



Spleen: The spleen is mildly enlarged, measuring 13.5 cm in length.



Pancreas: Unremarkable.



Adrenal glands: Unremarkable.



Kidneys: The unenhanced kidneys are normal in size and without hydronephrosis.

Mild medullary nephrocalcinosis is questioned. There are no renal calculi

identified. There is no evidence of contour deforming renal mass lesion.



Abdominal vasculature: The abdominal aorta is normal in course and caliber.



Bowel: The small bowel and colon are normal in course and caliber. The appendix

is  normal as visualized.



Peritoneum: There is no intraperitoneal free air or abdominal ascites. There is

a fat-containing umbilical hernia.



Lymphadenopathy: None.



Pelvic viscera: The bladder, uterus, and adnexa are normal as visualized. There

are bilateral ovarian follicles. Trace free fluid is noted in the cul-de-sac.



Skeletal structures: No lytic or blastic lesions are seen. Sclerotic change is

noted in the sacroiliac joints.





IMPRESSION:



1. There are no acute infectious or inflammatory findings in the abdomen or

pelvis.



2. There is trace free fluid in the cul-de-sac, likely within physiologic

limits.



3. Mild splenomegaly.



4. Question mild bilateral medullary nephrocalcinosis.



5. A 6 cm left lower lobe pleural-based nodule is of low suspicion and of

doubtful significance in this age group.







Electronically signed by:  Maksim Riggins M.D.

1/10/2018 7:35 AM



Dictated Date/Time:  1/10/2018 7:26 AM

## 2018-08-22 ENCOUNTER — HOSPITAL ENCOUNTER (EMERGENCY)
Dept: HOSPITAL 45 - C.EDB | Age: 28
Discharge: HOME | End: 2018-08-22
Payer: COMMERCIAL

## 2018-08-22 VITALS
HEIGHT: 65 IN | HEIGHT: 65 IN | WEIGHT: 150.13 LBS | BODY MASS INDEX: 25.01 KG/M2 | WEIGHT: 150.13 LBS | BODY MASS INDEX: 25.01 KG/M2

## 2018-08-22 VITALS — OXYGEN SATURATION: 98 % | HEART RATE: 55 BPM | DIASTOLIC BLOOD PRESSURE: 80 MMHG | SYSTOLIC BLOOD PRESSURE: 129 MMHG

## 2018-08-22 VITALS — TEMPERATURE: 98.42 F

## 2018-08-22 DIAGNOSIS — M62.838: Primary | ICD-10-CM

## 2018-08-22 DIAGNOSIS — Z79.3: ICD-10-CM

## 2018-08-22 DIAGNOSIS — Z84.1: ICD-10-CM

## 2018-08-22 DIAGNOSIS — Z82.49: ICD-10-CM

## 2018-08-22 DIAGNOSIS — Z83.6: ICD-10-CM

## 2018-08-22 DIAGNOSIS — Z80.9: ICD-10-CM

## 2018-08-22 DIAGNOSIS — Z87.440: ICD-10-CM

## 2018-08-22 NOTE — EMERGENCY ROOM VISIT NOTE
History


Report prepared by Caryn:  Manuel Wilkins


Under the Supervision of:  Dr. Starr Hayward D.O.


First contact with patient:  01:16


Chief Complaint:  NECK PAIN


Stated Complaint:  STIFF NECK/SHOULDER PAIN X 3DYS,MINOR HEADACHE





History of Present Illness


The patient is a 27 year old female who presents to the Emergency Room with 

complaints of constant neck pain beginning four days ago. The patient states 

that she first noticed her neck pain four days ago when she was at work. She 

notes that she was not feeling ill and did not lift anything heavy when her 

neck pain began. She reports that her neck feels stiff. The patient states that 

her pain worsens when she turns her neck. She notes that her neck has been 

swollen in the mornings. She also complains of shoulder pain, nausea, and of a 

mild headache. She reports that she feels nauseous because she has not been 

eating much. She denies any fever, chills, vomiting, and leg cramping/swelling. 

The patient states that she has been using ibuprofen, Advil, and heat with no 

relief of her symptoms. She notes that she has a history of a lower back injury 

but does not have any other problems. She reports that she does not believe 

that she is pregnant. The patient states that she drove herself to the 

emergency department today.





   Source of History:  patient


   Onset:  four days ago


   Position:  neck


   Quality:  other (stiffness)


   Timing:  constant


   Modifying Factors (Worsening):  other (turning her neck)


   Associated Symptoms:  + headache, + nausea, No fevers, No chills, No vomiting


Note:


The patient also complains of a swollen neck and shoulder pain. She also denies 

any leg cramping/swelling.





Review of Systems


See HPI for pertinent positives & negatives. A total of 10 systems reviewed and 

were otherwise negative.





Past Medical & Surgical


Medical Problems:


(1) Bleeding


(2) Bronchitis


(3) Headache in pregnancy


(4) History of urinary tract problem


(5) possible rupture of membranes


(6) spontaneous rupture of membranes at term


(7) Stomach problems


Surgical Problems:


(1) H/O colonoscopy


(2) History of wisdom tooth extraction








Family History





Cancer


Heart disease


Hypertension


Kidney disease


Kidney stones


Lung disease





Social History


Smoking Status:  Never Smoker


Alcohol Use:  none


Drug Use:  none


Marital Status:  in relationship


Housing Status:  lives with family


Occupation Status:  employed





Current/Historical Medications


Scheduled


Birth Control Pills (Birth Control Pills), 1 TAB PO DAILY





Allergies


Coded Allergies:  


     No Known Allergies (Unverified , 8/22/18)





Physical Exam


Vital Signs











  Date Time  Temp Pulse Resp B/P (MAP) Pulse Ox O2 Delivery O2 Flow Rate FiO2


 


8/22/18 02:58  55 18 129/80 98   


 


8/22/18 01:04 36.9 62 20 128/91 98 Room Air  











Physical Exam


HEENT: Head - normocephalic and atraumatic   Pupils are equal, round, and 

reactive to light.  Extraocular eye muscles are intact, and sclera are 

anicteric.   Nose -  moist nasal mucosa without discharge. Mouth - moist buccal 

mucosa.  Oropharynx is nonerythematous and there is no tonsillar exudate or 

edema noted.


Neck: Supple; no JVD, nuchal rigidity, cervical lymphadenopathy, or auscultated 

bruits. Moderate spasm to bilateral trapezius muscles and cervical paraspinous 

muscles. 


Heart: Regular rate and rhythm.  There is a normal S1 and S2 with no murmurs, 

clicks, or gallops appreciated.


Lungs: Clear to auscultation bilaterally with no wheezes, rales, or rhonchi.


Abdomen: Soft, completely nontender, nondistended, with good bowel sounds.  

There are no palpable pulsatile masses or hepatosplenomegaly.  There is no 

guarding, rigidity, or rebound noted.


Extremities: No evidence of cyanosis, clubbing, or edema.  There are easily 

palpable peripheral pulses.


Skin: warm and dry with good turgor and no rashes.





Medical Decision & Procedures


Medications Administered











 Medications


  (Trade)  Dose


 Ordered  Sig/Emerson


 Route  Start Time


 Stop Time Status Last Admin


Dose Admin


 


 Ketorolac


 Tromethamine


  (Toradol Inj)  60 mg  NOW  STAT


 IM  8/22/18 02:07


 8/22/18 02:08 DC 8/22/18 02:13


60 MG











Procedure


Medications Administered:


Toradol Inj 60mg IM.





ED Course


0200: Past medical records reviewed. The patient was evaluated in room B6. A 

complete history and physical exam was performed.





0207: Toradol Inj 60mg IM





0247: Upon reevaluation, the patient feels much better. She has increased ROM 

in her neck. I discussed findings and results with her. She verbalized 

agreement of the treatment plan. The patient was discharged home.





Medical Decision


The patient is a 27 year old female who presents to the Emergency Room with 

complaints of constant neck pain beginning four days ago. Differential 

diagnoses include: meningitis, torticollis, trapezius spasm, acute cervical 

disc injury, and cervical spine strain. 





Over the past couple of days, the patient has developed decreased range of 

motion in the neck and significant muscle spasm to the cervical spine or 

paraspinous muscles as well as the trapezius muscles.  She has been applying 

heat with no relief.  After receiving IM Toradol, the patient had moderate 

relief of her discomfort.  We did talk about the option of a muscle relaxer, 

however the patient will use NSAIDs.  She will be off work the next 2 days and 

was encouraged to avoid significant strenuous activity or heavy lifting over 

the next 3 or 4 days.  Once the patient is feeling somewhat better, I 

recommended that she go for a massage of her head neck and shoulders.  This may 

help with the muscle spasm.  She was told return to the emergency department if 

she developed any fevers or chills or worsening symptoms.





Medication Reconcilliation


Current Medication List:  was personally reviewed by me





Blood Pressure Screening


Patient's blood pressure:  Normal blood pressure


Blood pressure disposition:  Did not require urgent referral





Impression





 Primary Impression:  


 Trapezius muscle spasm


 Additional Impression:  


 Spasm of cervical paraspinous muscle





Scribe Attestation


The scribe's documentation has been prepared under my direction and personally 

reviewed by me in its entirety. I confirm that the note above accurately 

reflects all work, treatment, procedures, and medical decision making performed 

by me.





Departure Information


Dispostion


Home / Self-Care





Referrals


SKYLAR Strange M.D. (MEDICAL) (PCP)





Forms


HOME CARE DOCUMENTATION FORM,                                                 

               IMPORTANT VISIT INFORMATION, WORK / SCHOOL INSTRUCTIONS





Patient Instructions


My [facility]





Additional Instructions





Rest.





apply heat to the neck and shoulders.





Ibuprofen - 800mg every 6-8 hours with food for pain





Get a massage to help with muscle knots and spasm





No heavy lifting for next 2-3 days





Problem Qualifiers